# Patient Record
Sex: MALE | Race: WHITE
[De-identification: names, ages, dates, MRNs, and addresses within clinical notes are randomized per-mention and may not be internally consistent; named-entity substitution may affect disease eponyms.]

---

## 2019-10-01 ENCOUNTER — HOSPITAL ENCOUNTER (OUTPATIENT)
Dept: HOSPITAL 95 - ORSCSDS | Age: 59
Discharge: HOME | End: 2019-10-01
Attending: INTERNAL MEDICINE
Payer: COMMERCIAL

## 2019-10-01 VITALS — WEIGHT: 315 LBS | BODY MASS INDEX: 42.66 KG/M2 | HEIGHT: 72.01 IN

## 2019-10-01 DIAGNOSIS — K64.8: ICD-10-CM

## 2019-10-01 DIAGNOSIS — F90.9: ICD-10-CM

## 2019-10-01 DIAGNOSIS — K21.9: Primary | ICD-10-CM

## 2019-10-01 DIAGNOSIS — K62.1: ICD-10-CM

## 2019-10-01 DIAGNOSIS — Z79.899: ICD-10-CM

## 2019-10-01 DIAGNOSIS — F32.9: ICD-10-CM

## 2019-10-01 DIAGNOSIS — R11.2: ICD-10-CM

## 2019-10-01 DIAGNOSIS — K44.9: ICD-10-CM

## 2019-10-01 DIAGNOSIS — Z86.010: ICD-10-CM

## 2019-10-01 DIAGNOSIS — D12.3: ICD-10-CM

## 2019-10-01 DIAGNOSIS — Z87.891: ICD-10-CM

## 2019-10-01 DIAGNOSIS — D12.2: ICD-10-CM

## 2019-10-01 PROCEDURE — 0DBL8ZX EXCISION OF TRANSVERSE COLON, VIA NATURAL OR ARTIFICIAL OPENING ENDOSCOPIC, DIAGNOSTIC: ICD-10-PCS | Performed by: INTERNAL MEDICINE

## 2019-10-01 PROCEDURE — 0DBP8ZX EXCISION OF RECTUM, VIA NATURAL OR ARTIFICIAL OPENING ENDOSCOPIC, DIAGNOSTIC: ICD-10-PCS | Performed by: INTERNAL MEDICINE

## 2019-10-01 PROCEDURE — 0DJ08ZZ INSPECTION OF UPPER INTESTINAL TRACT, VIA NATURAL OR ARTIFICIAL OPENING ENDOSCOPIC: ICD-10-PCS | Performed by: INTERNAL MEDICINE

## 2019-10-01 PROCEDURE — 0DBM8ZX EXCISION OF DESCENDING COLON, VIA NATURAL OR ARTIFICIAL OPENING ENDOSCOPIC, DIAGNOSTIC: ICD-10-PCS | Performed by: INTERNAL MEDICINE

## 2019-10-01 PROCEDURE — 0DBK8ZX EXCISION OF ASCENDING COLON, VIA NATURAL OR ARTIFICIAL OPENING ENDOSCOPIC, DIAGNOSTIC: ICD-10-PCS | Performed by: INTERNAL MEDICINE

## 2019-10-01 NOTE — NUR
10/01/19 1433 Keely Shaver
PATIENT AND WIFE INFORMED OF PRIOR CASE RUNNING OVER.  BOTH VERBALIZED
UNDERSTANDING AND HAVE NO NEEDS.  CALL LIGHT WITHIN REACH AND PT
STATES HE IS COMFORTABLE